# Patient Record
Sex: MALE | Race: WHITE | NOT HISPANIC OR LATINO | URBAN - METROPOLITAN AREA
[De-identification: names, ages, dates, MRNs, and addresses within clinical notes are randomized per-mention and may not be internally consistent; named-entity substitution may affect disease eponyms.]

---

## 2022-06-03 ENCOUNTER — EMERGENCY (EMERGENCY)
Facility: HOSPITAL | Age: 22
LOS: 1 days | Discharge: HOME | End: 2022-06-03
Attending: EMERGENCY MEDICINE | Admitting: EMERGENCY MEDICINE
Payer: COMMERCIAL

## 2022-06-03 VITALS
HEIGHT: 71 IN | RESPIRATION RATE: 18 BRPM | DIASTOLIC BLOOD PRESSURE: 67 MMHG | OXYGEN SATURATION: 99 % | HEART RATE: 69 BPM | WEIGHT: 171.96 LBS | TEMPERATURE: 99 F | SYSTOLIC BLOOD PRESSURE: 125 MMHG

## 2022-06-03 LAB
GRAM STN FLD: SIGNIFICANT CHANGE UP
SPECIMEN SOURCE: SIGNIFICANT CHANGE UP

## 2022-06-03 PROCEDURE — 87070 CULTURE OTHR SPECIMN AEROBIC: CPT

## 2022-06-03 PROCEDURE — 99283 EMERGENCY DEPT VISIT LOW MDM: CPT

## 2022-06-03 PROCEDURE — 87529 HSV DNA AMP PROBE: CPT

## 2022-06-03 PROCEDURE — 87186 SC STD MICRODIL/AGAR DIL: CPT

## 2022-06-03 PROCEDURE — 87798 DETECT AGENT NOS DNA AMP: CPT

## 2022-06-03 RX ORDER — DICLOXACILLIN SODIUM 500 MG/1
1 CAPSULE ORAL
Qty: 28 | Refills: 0
Start: 2022-06-03 | End: 2022-06-09

## 2022-06-03 RX ORDER — MUPIROCIN 20 MG/G
1 OINTMENT TOPICAL
Qty: 1 | Refills: 0
Start: 2022-06-03 | End: 2022-06-07

## 2022-06-03 NOTE — ED ADULT NURSE NOTE - CAS EDN DISCHARGE ASSESSMENT
7 mo old presents for urgent visit with cold symptoms.  History provided by mother    SUBJECTIVE:   Nasal congestion and fussiness for the past 3 days. Cough and hoarseness noted today. Associated 100.9 temp last PM. Denies vomiting, diarrhea, or rash. Denies wheezing or labored breathing.    Social hx: no , but exposed to school aged children daily    ALLERGIES:none  CURRENT MEDS:none    OBJECTIVE:  Well nourished. Well developed. Alert,  in NAD    HEENT: Right TM clear. Left TM clear. Clear nasal discharge. Throat clear. Moist mucous membranes. Neck supple without adenopathy.  LUNGS: clear with good air exchange. No rales, wheezes, or stridor.  HEART: RRR without murmur  ABDOMEN: soft with active BS. No masses or organomegaly. Non-tender  SKIN: no rash  NEURO: intact    IMP:  Acute URI    PLAN:  Medications: Normal saline drops/bulb sxn prn.  Advised/cautioned:  Cool mist humidifier, adequate hydration.   Return if symptoms worsen or new symptoms develop.    
Alert and oriented to person, place and time/Patient baseline mental status/Awake

## 2022-06-03 NOTE — ED PROVIDER NOTE - NSFOLLOWUPINSTRUCTIONS_ED_ALL_ED_FT
Take dicloxacillin 4 times a day for the following week.  Apply mupirocin ointment to your rash.  Follow up with a dermatologist for re-evaluation within the next 2-3 days.  Return to er for any new or worsening symptoms (fever, chills, worsening rash, difficulty breathing...).      [facility] [place]                                                                                Impetigo, Adult      Impetigo is an infection of the skin. It commonly occurs in young children, but it can also occur in adults. The infection causes itchy blisters and sores that produce brownish-yellow fluid. As the fluid dries, it forms a thick, honey-colored crust. These skin changes usually occur on the face, but they can also affect other areas of the body. Impetigo usually goes away in 7–10 days with treatment.      What are the causes?    This condition is caused by two types of bacteria. It may be caused by staphylococci or streptococci bacteria. These bacteria cause impetigo when they get under the surface of the skin. This often happens after some damage to the skin, such as:  •Cuts, scrapes, or scratches.      •Rashes.      •Insect bites, especially when you scratch the area of a bite.      •Chickenpox or other illnesses that cause open skin sores.      •Nail biting or chewing.      Impetigo can spread easily from one person to another (is contagious). It may be spread through close skin contact or by sharing towels, clothing, or other items that an infected person has touched.    Scratching the affected area can cause impetigo to spread to other parts of the body. The bacteria can get under your fingernails and spread when you touch another area of your skin.      What increases the risk?    The following factors may make you more likely to develop this condition:  •Playing sports that include skin-to-skin contact with others.      •Having broken skin, such as from a cut or scrape.      •Living in an area that has high humidity levels.      •Having poor hygiene.      •Having high levels of staphylococci in your nose.    •Having a condition that weakens the skin integrity, such as:  •Having a weak body defense system (immune system).      •Having a skin condition with open sores, such as chickenpox.      •Having diabetes.          What are the signs or symptoms?    The main symptom of this condition is small blisters, often on the face around the mouth and nose. In time, the blisters break open and turn into tiny sores (lesions) with a yellow crust. In some cases, the blisters cause itching or burning. Scratching, irritation, or lack of treatment may cause these small lesions to get larger.    Other possible symptoms include:  •Larger blisters.      •Pus.      •Swollen lymph glands.        How is this diagnosed?    This condition is usually diagnosed during a physical exam. A skin sample or a sample of fluid from a blister may be taken for lab tests that involve growing bacteria (culture test). Lab tests can help confirm the diagnosis or help determine the best treatment.      How is this treated?    Treatment for this condition depends on the severity of the condition:  •Mild impetigo can be treated with prescription antibiotic cream.      •Oral antibiotic medicine may be used in more severe cases.      •Medicines that reduce itchiness (antihistamines)may also be used.         Follow these instructions at home:    Medicines     •Take over-the-counter and prescription medicines only as told by your health care provider.      •Apply or take your antibiotic as told by your health care provider. Do not stop using the antibiotic even if your condition improves.    •Before applying antibiotic cream or ointment, you should:  •Gently wash the infected areas with antibacterial soap and warm water.      •Soak crusted areas in warm, soapy water using antibacterial soap.      •Gently rub the areas to remove crusts. Do not scrub.          Preventing the spread of infection    •To help prevent impetigo from spreading to other body areas:  •Keep your fingernails short and clean.      •Do not scratch the blisters or sores.      •Cover infected areas, if necessary, to keep from scratching.      •Wash your hands often with soap and warm water.      •To help prevent impetigo from spreading to other people:  •Do not share towels.      •Wash your clothing and bedsheets in water that is 140°F (60°C) or warmer.    •Stay home until you have used an antibiotic cream for 48 hours (2 days) or an oral antibiotic medicine for 24 hours (1 day).  •You should only return to work and activities with other people if your skin shows significant improvement.      •You may return to contact sports after you have used antibiotic medicine for 72 hours (3 days).          General instructions     •Keep all follow-up visits. This is important.        How is this prevented?    •Wash your hands often with soap and warm water.      • Do not share towels, washcloths, clothing, bedding, or razors.      •Keep your fingernails short.      •Keep any cuts, scrapes, bug bites, or rashes clean and covered.      •Use insect repellent to prevent bug bites.        Contact a health care provider if:    •You develop more blisters or sores, even with treatment.      •Other family members get sores.      •Your skin sores are not improving after 72 hours (3 days) of treatment.      •You have a fever.        Get help right away if:    •You see spreading redness or swelling of the skin around your sores.      •You develop a sore throat.      •The area around your rash becomes warm, red, or tender to the touch.      •You have dark, reddish-brown urine.      •You do not urinate often or you urinate small amounts.      •You are very tired (lethargic).      •You have swelling in your face, hands, or feet.        Summary    •Impetigo is a skin infection that causes itchy blisters and sores that produce brownish-yellow fluid. As the fluid dries, it forms a crust.      •This condition is caused by staphylococci or streptococci bacteria. These bacteria cause impetigo when they get under the surface of the skin, such as through cuts, rashes, bug bites, or open sores.      •Treatment for this condition may include antibiotic ointment or oral antibiotics.      •To help prevent impetigo from spreading to other body areas, make sure you keep your fingernails short, avoid scratching, cover any blisters, and wash your hands often.      •If you have impetigo, stay home until you have used an antibiotic cream for 48 hours (2 days) or an oral antibiotic medicine for 24 hours (1 day). You should only return to work and activities with other people if your skin shows significant improvement.      This information is not intended to replace advice given to you by your health care provider. Make sure you discuss any questions you have with your health care provider.      Document Revised: 05/19/2021 Document Reviewed: 05/19/2021    [employer] Patient Education © 2022 [employer]

## 2022-06-03 NOTE — ED ADULT NURSE NOTE - OBJECTIVE STATEMENT
Patient presents with vesicular rash to R and L cheek, R > L. Patient denies fevers/chill/sore throat. Denies new soap/facial products. Speaking in full, complete sentences. Answering questions and following verbal commands appropriately.

## 2022-06-03 NOTE — ED PROVIDER NOTE - OBJECTIVE STATEMENT
23 y/o M with no significant PMHx presents to ED with c/o rash to face since yesterday. Pt states he slept on a lounge chair yesterday and woke with itchy insect bites to his cheeks, right greater than left. Pt avoided itching but noted rash was getting worse and spreading as the day went on. Pt has been applying Aquaphor without improvement. ?slight tongue swelling earlier in the day that has since resolved. Denies difficulty swallowing or breathing, sensation of throat closing, fever, chills, URI symptoms, N/V/D, abdominal pain, lymphadenopathy, or sick contacts. 23 y/o M with no significant PMHx presents to ED with c/o rash to face since yesterday. Pt states he slept on a lounge chair yesterday and woke with itchy insect bites to his cheeks, right greater than left. Pt avoided itching but noted rash was getting worse and spreading as the day went on. Pt has been applying Aquaphor without improvement. ?slight tongue swelling earlier in the day that has since resolved. + yellow colored weeping from rash. No pus drainage. Denies difficulty swallowing or breathing, sensation of throat closing, No sorethroat, rash to hands/ feet, fever, chills, URI symptoms, N/V/D, abdominal pain, lymphadenopathy, or sick contacts. No new product use/ new food ingestions.

## 2022-06-03 NOTE — ED PROVIDER NOTE - CLINICAL SUMMARY MEDICAL DECISION MAKING FREE TEXT BOX
Impression: acute rash to face since yesterday after waking with bug bites to face. Vesicular lesions noted from right cheek down to chin and 1 vesicle noted to left cheek. No involvement of mucous membranes. Pt is afebrile and hemodynamically stable. Shingles less likely given bilaterality of symptoms and rash not following a particular dermatome. ?impetigo vs bolus impetigo. Monkey pox unlikely given no associated fever, chills, or lymphadenopathy. Will send HSV swab of lesion, although low suspicion. Will treat with abx for possible impetigo. Pt to follow up with dermatology for further evaluation. Impression: acute rash to face since yesterday after waking up with bug bites to face. Vesicular lesions noted tp b/l cheeks, R > L, including chin. No involvement of mucous membranes. Pt is afebrile and hemodynamically stable. Shingles less likely given bilaterality of symptoms and rash not following a particular dermatome. ?impetigo/ bullous impetigo. Monkey pox unlikely given no associated fever, chills, or lymphadenopathy. Will send wound and hsv cx, although low suspicion and will not tx for shingles at this time. Will treat with diclox for possible impetigo. Pt to follow up with dermatology for further evaluation. ED evaluation and management discussed with the patient in detail.  Close derm and pmd follow up encouraged.  Strict ED return instructions discussed in detail and patient given the opportunity to ask any questions about their discharge diagnosis and instructions. Patient verbalized understanding. Impression: acute rash to face since yesterday after waking up with bug bites to face. Papular, pustular and vesicular lesions noted to b/l cheeks, R > L, including chin. No involvement of mucous membranes. Pt is afebrile and hemodynamically stable. Shingles less likely given bilaterality of symptoms and rash not following a particular dermatome. ?impetigo/ bullous impetigo. Monkey pox unlikely given no associated fever, chills, or lymphadenopathy. Atypical presentation for coxsackie. Will send wound and hsv cx, although low suspicion and will not tx for shingles at this time. Will treat with diclox for possible impetigo. Pt to follow up with dermatology for further evaluation. ED evaluation and management discussed with the patient in detail.  Close derm and pmd follow up encouraged.  Strict ED return instructions discussed in detail and patient given the opportunity to ask any questions about their discharge diagnosis and instructions. Patient verbalized understanding.

## 2022-06-03 NOTE — ED PROVIDER NOTE - PATIENT PORTAL LINK FT
You can access the FollowMyHealth Patient Portal offered by [facility] by registering at the following website: [email or web address]. By joining [employer]’s FollowMyHealth portal, you will also be able to view your health information using other applications (apps) compatible with our system.

## 2022-06-03 NOTE — ED PROVIDER NOTE - PHYSICAL EXAMINATION
VITAL SIGNS: I have reviewed nursing notes and confirm.  CONSTITUTIONAL: Well appearing, in no acute distress.   SKIN:  warm and dry; erythematous papular and vesicular lesions noted to right cheek down to right chin; 1 yellow filled vesicle to left cheek; nontender, no crusting; does not appear to follow any specific dermatome, ears and nose spared; no intraoral lesions noted, oropharynx clear.   HEAD:  normocephalic, atraumatic.  EYES: EOM intact; conjunctiva and sclera clear.  ENT: No nasal discharge; airway clear.   NECK: Supple; non tender.  NEURO: Alert, oriented, grossly unremarkable  PSYCH: Cooperative, mood and affect appropriate. VITAL SIGNS: I have reviewed nursing notes and confirm.  CONSTITUTIONAL: Well appearing, in no acute distress.   SKIN:  warm and dry; erythematous papular, pustular and vesicular lesions noted to right cheek > left, and involving chin; + yellow filled vesicles to predominantly r cheek and few to left cheek; nontender, scant amt of crusting; does not appear to follow any specific dermatome, ears and nose spared; no intraoral lesions noted, oropharynx clear.   HEAD:  normocephalic, atraumatic.  EYES: EOM intact; conjunctiva and sclera clear.  ENT: No nasal discharge; airway clear.   NECK: Supple.  NEURO: Alert, oriented, grossly unremarkable  PSYCH: Cooperative, mood and affect appropriate.

## 2022-06-04 ENCOUNTER — EMERGENCY (EMERGENCY)
Facility: HOSPITAL | Age: 22
LOS: 1 days | Discharge: HOME | End: 2022-06-04
Attending: EMERGENCY MEDICINE | Admitting: EMERGENCY MEDICINE
Payer: COMMERCIAL

## 2022-06-04 VITALS
RESPIRATION RATE: 16 BRPM | SYSTOLIC BLOOD PRESSURE: 134 MMHG | TEMPERATURE: 99 F | WEIGHT: 171.96 LBS | HEIGHT: 71 IN | DIASTOLIC BLOOD PRESSURE: 71 MMHG | OXYGEN SATURATION: 98 % | HEART RATE: 73 BPM

## 2022-06-04 DIAGNOSIS — H53.71 GLARE SENSITIVITY: ICD-10-CM

## 2022-06-04 DIAGNOSIS — H53.149 VISUAL DISCOMFORT, UNSPECIFIED: ICD-10-CM

## 2022-06-04 DIAGNOSIS — Z20.822 CONTACT WITH AND (SUSPECTED) EXPOSURE TO COVID-19: ICD-10-CM

## 2022-06-04 DIAGNOSIS — R11.10 VOMITING, UNSPECIFIED: ICD-10-CM

## 2022-06-04 DIAGNOSIS — R51.9 HEADACHE, UNSPECIFIED: ICD-10-CM

## 2022-06-04 LAB
ALBUMIN SERPL ELPH-MCNC: 4.9 G/DL — SIGNIFICANT CHANGE UP (ref 3.3–5)
ALP SERPL-CCNC: 66 U/L — SIGNIFICANT CHANGE UP (ref 40–120)
ALT FLD-CCNC: 13 U/L — SIGNIFICANT CHANGE UP (ref 10–45)
ANION GAP SERPL CALC-SCNC: 10 MMOL/L — SIGNIFICANT CHANGE UP (ref 5–17)
AST SERPL-CCNC: 24 U/L — SIGNIFICANT CHANGE UP (ref 10–40)
BASOPHILS # BLD AUTO: 0.04 K/UL — SIGNIFICANT CHANGE UP (ref 0–0.2)
BASOPHILS NFR BLD AUTO: 0.5 % — SIGNIFICANT CHANGE UP (ref 0–2)
BILIRUB SERPL-MCNC: 1.4 MG/DL — HIGH (ref 0.2–1.2)
BUN SERPL-MCNC: 13 MG/DL — SIGNIFICANT CHANGE UP (ref 7–23)
CALCIUM SERPL-MCNC: 9.1 MG/DL — SIGNIFICANT CHANGE UP (ref 8.4–10.5)
CHLORIDE SERPL-SCNC: 104 MMOL/L — SIGNIFICANT CHANGE UP (ref 96–108)
CO2 SERPL-SCNC: 24 MMOL/L — SIGNIFICANT CHANGE UP (ref 22–31)
CREAT SERPL-MCNC: 1 MG/DL — SIGNIFICANT CHANGE UP (ref 0.5–1.3)
EGFR: 108 ML/MIN/1.73M2 — SIGNIFICANT CHANGE UP
EOSINOPHIL # BLD AUTO: 0.05 K/UL — SIGNIFICANT CHANGE UP (ref 0–0.5)
EOSINOPHIL NFR BLD AUTO: 0.6 % — SIGNIFICANT CHANGE UP (ref 0–6)
GLUCOSE SERPL-MCNC: 114 MG/DL — HIGH (ref 70–99)
HCT VFR BLD CALC: 43.8 % — SIGNIFICANT CHANGE UP (ref 39–50)
HGB BLD-MCNC: 14.3 G/DL — SIGNIFICANT CHANGE UP (ref 13–17)
HSV+VZV DNA SPEC QL NAA+PROBE: SIGNIFICANT CHANGE UP
IMM GRANULOCYTES NFR BLD AUTO: 0.4 % — SIGNIFICANT CHANGE UP (ref 0–1.5)
LYMPHOCYTES # BLD AUTO: 0.97 K/UL — LOW (ref 1–3.3)
LYMPHOCYTES # BLD AUTO: 11.3 % — LOW (ref 13–44)
MCHC RBC-ENTMCNC: 29.3 PG — SIGNIFICANT CHANGE UP (ref 27–34)
MCHC RBC-ENTMCNC: 32.6 GM/DL — SIGNIFICANT CHANGE UP (ref 32–36)
MCV RBC AUTO: 89.8 FL — SIGNIFICANT CHANGE UP (ref 80–100)
MONOCYTES # BLD AUTO: 0.53 K/UL — SIGNIFICANT CHANGE UP (ref 0–0.9)
MONOCYTES NFR BLD AUTO: 6.2 % — SIGNIFICANT CHANGE UP (ref 2–14)
NEUTROPHILS # BLD AUTO: 6.95 K/UL — SIGNIFICANT CHANGE UP (ref 1.8–7.4)
NEUTROPHILS NFR BLD AUTO: 81 % — HIGH (ref 43–77)
NRBC # BLD: 0 /100 WBCS — SIGNIFICANT CHANGE UP (ref 0–0)
PLATELET # BLD AUTO: 256 K/UL — SIGNIFICANT CHANGE UP (ref 150–400)
POTASSIUM SERPL-MCNC: 4.5 MMOL/L — SIGNIFICANT CHANGE UP (ref 3.5–5.3)
POTASSIUM SERPL-SCNC: 4.5 MMOL/L — SIGNIFICANT CHANGE UP (ref 3.5–5.3)
PROT SERPL-MCNC: 7.5 G/DL — SIGNIFICANT CHANGE UP (ref 6–8.3)
RBC # BLD: 4.88 M/UL — SIGNIFICANT CHANGE UP (ref 4.2–5.8)
RBC # FLD: 12.3 % — SIGNIFICANT CHANGE UP (ref 10.3–14.5)
SARS-COV-2 RNA SPEC QL NAA+PROBE: SIGNIFICANT CHANGE UP
SODIUM SERPL-SCNC: 138 MMOL/L — SIGNIFICANT CHANGE UP (ref 135–145)
SPECIMEN SOURCE: SIGNIFICANT CHANGE UP
WBC # BLD: 8.57 K/UL — SIGNIFICANT CHANGE UP (ref 3.8–10.5)
WBC # FLD AUTO: 8.57 K/UL — SIGNIFICANT CHANGE UP (ref 3.8–10.5)

## 2022-06-04 PROCEDURE — 96374 THER/PROPH/DIAG INJ IV PUSH: CPT

## 2022-06-04 PROCEDURE — 70450 CT HEAD/BRAIN W/O DYE: CPT | Mod: MG

## 2022-06-04 PROCEDURE — 99285 EMERGENCY DEPT VISIT HI MDM: CPT | Mod: 25

## 2022-06-04 PROCEDURE — U0003: CPT

## 2022-06-04 PROCEDURE — 85025 COMPLETE CBC W/AUTO DIFF WBC: CPT

## 2022-06-04 PROCEDURE — 82962 GLUCOSE BLOOD TEST: CPT

## 2022-06-04 PROCEDURE — 99285 EMERGENCY DEPT VISIT HI MDM: CPT

## 2022-06-04 PROCEDURE — G1004: CPT

## 2022-06-04 PROCEDURE — U0005: CPT

## 2022-06-04 PROCEDURE — 80053 COMPREHEN METABOLIC PANEL: CPT

## 2022-06-04 PROCEDURE — 36415 COLL VENOUS BLD VENIPUNCTURE: CPT

## 2022-06-04 PROCEDURE — 70450 CT HEAD/BRAIN W/O DYE: CPT | Mod: 26,MG

## 2022-06-04 PROCEDURE — 96375 TX/PRO/DX INJ NEW DRUG ADDON: CPT

## 2022-06-04 RX ORDER — ACETAMINOPHEN 500 MG
1000 TABLET ORAL ONCE
Refills: 0 | Status: COMPLETED | OUTPATIENT
Start: 2022-06-04 | End: 2022-06-04

## 2022-06-04 RX ORDER — SODIUM CHLORIDE 9 MG/ML
1000 INJECTION INTRAMUSCULAR; INTRAVENOUS; SUBCUTANEOUS ONCE
Refills: 0 | Status: COMPLETED | OUTPATIENT
Start: 2022-06-04 | End: 2022-06-04

## 2022-06-04 RX ORDER — METOCLOPRAMIDE HCL 10 MG
10 TABLET ORAL ONCE
Refills: 0 | Status: COMPLETED | OUTPATIENT
Start: 2022-06-04 | End: 2022-06-04

## 2022-06-04 RX ADMIN — SODIUM CHLORIDE 1000 MILLILITER(S): 9 INJECTION INTRAMUSCULAR; INTRAVENOUS; SUBCUTANEOUS at 10:51

## 2022-06-04 RX ADMIN — Medication 400 MILLIGRAM(S): at 12:24

## 2022-06-04 RX ADMIN — Medication 10 MILLIGRAM(S): at 10:51

## 2022-06-04 NOTE — ED PROVIDER NOTE - CLINICAL SUMMARY MEDICAL DECISION MAKING FREE TEXT BOX
23 y/o m presents with headache since last night.  Pt nontoxic appearing, no neuro deficits on exam.  Headache improved with tylenol, IV fluid and reglan.  CT head unremarkable.  Will d/c, continue abx, f/u derm, return to ED if sx worsen.

## 2022-06-04 NOTE — ED ADULT TRIAGE NOTE - CHIEF COMPLAINT QUOTE
Patient arrives ambulatory reporting nausea and migraine, states he was seen yesterday in ED for rash to face and instructed to return if symptoms became worse.  Took Tylenol today, no significant relief.

## 2022-06-04 NOTE — ED ADULT NURSE NOTE - OBJECTIVE STATEMENT
Pt is 22 y.o male client complaining of migraine. Pt A&Ox4. Pt is able to communicate and has steady gait. Pt was seen in the ED yesterday for rash to face and instructed to return if symptoms became worse.  Pt took Tylenol today, no significant relief. Pt admits feeling nausea and had diarrhea at home. Pt denies chest pain, sob, vomiting, weakness, HA, dizziness, lightheadedness and blurred vision.

## 2022-06-04 NOTE — ED PROVIDER NOTE - OBJECTIVE STATEMENT
21 y/o m presents c/o headache which started gradually last night prior to going to bed.  Pt stating this morning it felt worse, he is sensitive to light and vomited once prior to arrival.  Pt was in ED with a facial rash, given dicloxacillin and reports improvement today.  Denies fever, chills, neck pain, recent travel, all other ROS negative.

## 2022-06-04 NOTE — ED PROVIDER NOTE - NSFOLLOWUPINSTRUCTIONS_ED_ALL_ED_FT
Acute Headache    WHAT YOU NEED TO KNOW:    An acute headache is pain or discomfort that may start suddenly and get worse quickly. You may have an acute headache only when you feel stress or eat certain foods. Other acute headache pain can happen every day, and sometimes several times a day.     DISCHARGE INSTRUCTIONS:    Return to the emergency department if:   •You have severe pain.      •You have numbness or weakness on one side of your face or body.      •You have a headache that occurs after a blow to the head, a fall, or other trauma.       •You have a headache, are forgetful or confused, or have trouble speaking.      •You have a headache, stiff neck, and a fever.      Call your doctor if:   •You have a constant headache and are vomiting.      •You have a headache each day that does not get better, even after treatment.      •You have changes in your headaches, or new symptoms that occur when you have a headache.      •You have questions or concerns about your condition or care.      Medicines: You may need any of the following:   •Prescription pain medicine may be given. The medicine your healthcare provider recommends will depend on the kind of headaches you have. You will need to take prescription headache medicines as directed to prevent a problem called rebound headache. These headaches happen with regular use of pain relievers for headache disorders.      •NSAIDs, such as ibuprofen, help decrease swelling, pain, and fever. This medicine is available with or without a doctor's order. NSAIDs can cause stomach bleeding or kidney problems in certain people. If you take blood thinner medicine, always ask your healthcare provider if NSAIDs are safe for you. Always read the medicine label and follow directions.      •Acetaminophen decreases pain and fever. It is available without a doctor's order. Ask how much to take and how often to take it. Follow directions. Read the labels of all other medicines you are using to see if they also contain acetaminophen, or ask your doctor or pharmacist. Acetaminophen can cause liver damage if not taken correctly.      •Antidepressants may be given for some kinds of headaches.      •Take your medicine as directed. Contact your healthcare provider if you think your medicine is not helping or if you have side effects. Tell him or her if you are allergic to any medicine. Keep a list of the medicines, vitamins, and herbs you take. Include the amounts, and when and why you take them. Bring the list or the pill bottles to follow-up visits. Carry your medicine list with you in case of an emergency.      Manage your symptoms:   •Apply heat or ice on the headache area. Use a heat or ice pack. For an ice pack, you can also put crushed ice in a plastic bag. Cover the pack or bag with a towel before you apply it to your skin. Ice and heat both help decrease pain, and heat also helps decrease muscle spasms. Apply heat for 20 to 30 minutes every 2 hours. Apply ice for 15 to 20 minutes every hour. Apply heat or ice for as long and for as many days as directed. You may alternate heat and ice.      •Relax your muscles. Lie down in a comfortable position and close your eyes. Relax your muscles slowly. Start at your toes and work your way up your body.      •Keep a record of your headaches. Write down when your headaches start and stop. Include your symptoms and what you were doing when the headache began. Record what you ate or drank for 24 hours before the headache started. Describe the pain and where it hurts. Keep track of what you did to treat your headache and if it worked.       Prevent an acute headache:   •Avoid anything that triggers an acute headache. Examples include exposure to chemicals, going to high altitude, or not getting enough sleep. Create a regular sleep routine. Go to sleep at the same time and wake up at the same time each day. Do not use electronic devices before bedtime. These may trigger a headache or prevent you from sleeping well.      •Do not smoke. Nicotine and other chemicals in cigarettes and cigars can trigger an acute headache or make it worse. Ask your healthcare provider for information if you currently smoke and need help to quit. E-cigarettes or smokeless tobacco still contain nicotine. Talk to your healthcare provider before you use these products.       •Limit alcohol as directed. Alcohol can trigger an acute headache or make it worse. If you have cluster headaches, do not drink alcohol during an episode. For other types of headaches, ask your healthcare provider if it is safe for you to drink alcohol. Ask how much is safe for you to drink, and how often.      •Exercise as directed. Exercise can reduce tension and help with headache pain. Aim for 30 minutes of physical activity on most days of the week. Your healthcare provider can help you create an exercise plan.      •Eat a variety of healthy foods. Healthy foods include fruits, vegetables, low-fat dairy products, lean meats, fish, whole grains, and cooked beans. Your healthcare provider or dietitian can help you create meals plans if you need to avoid foods that trigger headaches.      Follow up with your healthcare provider as directed: Bring your headache record with you when you see your healthcare provider. Write down your questions so you remember to ask them during your visits.

## 2022-06-04 NOTE — ED PROVIDER NOTE - PATIENT PORTAL LINK FT
You can access the FollowMyHealth Patient Portal offered by [facility] by registering at the following website: [email or web address]. By joining [employer]’s FollowMyHealth portal, you will also be able to view your health information using other applications (apps) compatible with our system.

## 2022-06-04 NOTE — ED PROVIDER NOTE - NS ED ATTENDING STATEMENT MOD
This was a shared visit with the ANNAMARIA. I reviewed and verified the documentation and independently performed the documented:

## 2022-06-04 NOTE — ED PROVIDER NOTE - ATTENDING APP SHARED VISIT CONTRIBUTION OF CARE
23 yo male c/o generalized headache - gradual onset, + photophobia, n/v.  No h/o or fh ha.  Pt seen yest for facial rash - started abx and cream w some improvement in sx.  No uri sx.  No head trauma, neck stiffness, fever.  No change in vision/speech/gait, numbness or weakness in ext   Well appearing, nad, nc/at, + photophobia, neck supple, rash on face improved compared to pics from yest, lung cta, heart reg, abd soft, nt, ext no gross deformity, awake, alert, oriented x 3, CN II-XII grossly intact, motor 5/5, no gross sens deficits, gait steady, no ataxia, speech clear.   Pt well appearing w migraine type ha.  No fever, neck stiffness to suggest meningitis.  Plan ct head, migraine meds.  Reassess.

## 2022-06-06 DIAGNOSIS — R21 RASH AND OTHER NONSPECIFIC SKIN ERUPTION: ICD-10-CM

## 2022-06-06 DIAGNOSIS — L29.9 PRURITUS, UNSPECIFIED: ICD-10-CM

## 2022-06-08 LAB
-  CEFAZOLIN: SIGNIFICANT CHANGE UP
-  CLINDAMYCIN: SIGNIFICANT CHANGE UP
-  ERYTHROMYCIN: SIGNIFICANT CHANGE UP
-  LINEZOLID: SIGNIFICANT CHANGE UP
-  OXACILLIN: SIGNIFICANT CHANGE UP
-  RIFAMPIN: SIGNIFICANT CHANGE UP
-  TRIMETHOPRIM/SULFAMETHOXAZOLE: SIGNIFICANT CHANGE UP
-  VANCOMYCIN: SIGNIFICANT CHANGE UP
METHOD TYPE: SIGNIFICANT CHANGE UP

## 2022-06-10 LAB
CULTURE RESULTS: SIGNIFICANT CHANGE UP
ORGANISM # SPEC MICROSCOPIC CNT: SIGNIFICANT CHANGE UP
ORGANISM # SPEC MICROSCOPIC CNT: SIGNIFICANT CHANGE UP
SPECIMEN SOURCE: SIGNIFICANT CHANGE UP
